# Patient Record
Sex: FEMALE | Race: BLACK OR AFRICAN AMERICAN | URBAN - METROPOLITAN AREA
[De-identification: names, ages, dates, MRNs, and addresses within clinical notes are randomized per-mention and may not be internally consistent; named-entity substitution may affect disease eponyms.]

---

## 2023-06-26 SDOH — ECONOMIC STABILITY: FOOD INSECURITY: WITHIN THE PAST 12 MONTHS, THE FOOD YOU BOUGHT JUST DIDN'T LAST AND YOU DIDN'T HAVE MONEY TO GET MORE.: NEVER TRUE

## 2023-06-26 SDOH — ECONOMIC STABILITY: TRANSPORTATION INSECURITY
IN THE PAST 12 MONTHS, HAS LACK OF TRANSPORTATION KEPT YOU FROM MEETINGS, WORK, OR FROM GETTING THINGS NEEDED FOR DAILY LIVING?: NO

## 2023-06-26 SDOH — ECONOMIC STABILITY: INCOME INSECURITY: HOW HARD IS IT FOR YOU TO PAY FOR THE VERY BASICS LIKE FOOD, HOUSING, MEDICAL CARE, AND HEATING?: SOMEWHAT HARD

## 2023-06-26 SDOH — ECONOMIC STABILITY: HOUSING INSECURITY
IN THE LAST 12 MONTHS, WAS THERE A TIME WHEN YOU DID NOT HAVE A STEADY PLACE TO SLEEP OR SLEPT IN A SHELTER (INCLUDING NOW)?: NO

## 2023-06-26 SDOH — ECONOMIC STABILITY: FOOD INSECURITY: WITHIN THE PAST 12 MONTHS, YOU WORRIED THAT YOUR FOOD WOULD RUN OUT BEFORE YOU GOT MONEY TO BUY MORE.: SOMETIMES TRUE

## 2023-06-29 ENCOUNTER — OFFICE VISIT (OUTPATIENT)
Dept: OBGYN CLINIC | Age: 28
End: 2023-06-29
Payer: COMMERCIAL

## 2023-06-29 VITALS
HEIGHT: 71 IN | WEIGHT: 208 LBS | BODY MASS INDEX: 29.12 KG/M2 | SYSTOLIC BLOOD PRESSURE: 114 MMHG | DIASTOLIC BLOOD PRESSURE: 66 MMHG

## 2023-06-29 DIAGNOSIS — Z12.4 PAP SMEAR FOR CERVICAL CANCER SCREENING: ICD-10-CM

## 2023-06-29 DIAGNOSIS — Z11.3 SCREEN FOR STD (SEXUALLY TRANSMITTED DISEASE): ICD-10-CM

## 2023-06-29 DIAGNOSIS — N89.8 VAGINAL DISCHARGE: ICD-10-CM

## 2023-06-29 DIAGNOSIS — Z12.4 PAP SMEAR FOR CERVICAL CANCER SCREENING: Primary | ICD-10-CM

## 2023-06-29 DIAGNOSIS — Z01.419 WOMEN'S ANNUAL ROUTINE GYNECOLOGICAL EXAMINATION: ICD-10-CM

## 2023-06-29 LAB
HBV SURFACE AG SER QL: NONREACTIVE
HCV AB SER QL: NONREACTIVE
HIV 1+2 AB+HIV1 P24 AG SERPL QL IA: NONREACTIVE
HIV 1/2 RESULT COMMENT: NORMAL

## 2023-06-29 PROCEDURE — 99385 PREV VISIT NEW AGE 18-39: CPT | Performed by: NURSE PRACTITIONER

## 2023-06-29 RX ORDER — DUPILUMAB 300 MG/2ML
INJECTION, SOLUTION SUBCUTANEOUS
COMMUNITY
Start: 2023-06-19

## 2023-06-29 RX ORDER — VALACYCLOVIR HYDROCHLORIDE 500 MG/1
500 TABLET, FILM COATED ORAL 2 TIMES DAILY
Qty: 6 TABLET | Refills: 6 | Status: SHIPPED | OUTPATIENT
Start: 2023-06-29 | End: 2023-07-02

## 2023-06-29 RX ORDER — VALACYCLOVIR HYDROCHLORIDE 500 MG/1
500 TABLET, FILM COATED ORAL PRN
COMMUNITY

## 2023-06-29 ASSESSMENT — PATIENT HEALTH QUESTIONNAIRE - PHQ9
SUM OF ALL RESPONSES TO PHQ QUESTIONS 1-9: 1
SUM OF ALL RESPONSES TO PHQ QUESTIONS 1-9: 1
SUM OF ALL RESPONSES TO PHQ9 QUESTIONS 1 & 2: 1
1. LITTLE INTEREST OR PLEASURE IN DOING THINGS: 1
2. FEELING DOWN, DEPRESSED OR HOPELESS: 0
SUM OF ALL RESPONSES TO PHQ QUESTIONS 1-9: 1
SUM OF ALL RESPONSES TO PHQ QUESTIONS 1-9: 1

## 2023-06-30 LAB — RPR SER QL: NONREACTIVE

## 2023-07-01 LAB
A VAGINAE DNA VAG QL NAA+PROBE: ABNORMAL SCORE
BVAB2 DNA VAG QL NAA+PROBE: ABNORMAL SCORE
C ALBICANS DNA VAG QL NAA+PROBE: NEGATIVE
C GLABRATA DNA VAG QL NAA+PROBE: NEGATIVE
C TRACH RRNA SPEC QL NAA+PROBE: NEGATIVE
MEGA1 DNA VAG QL NAA+PROBE: ABNORMAL SCORE
N GONORRHOEA RRNA SPEC QL NAA+PROBE: NEGATIVE
SPECIMEN SOURCE: ABNORMAL
T VAGINALIS RRNA SPEC QL NAA+PROBE: NEGATIVE

## 2023-07-02 ENCOUNTER — TELEPHONE (OUTPATIENT)
Dept: OBGYN CLINIC | Age: 28
End: 2023-07-02

## 2023-07-02 DIAGNOSIS — N76.0 BACTERIAL VAGINOSIS: Primary | ICD-10-CM

## 2023-07-02 DIAGNOSIS — A60.00 GENITAL HERPES SIMPLEX, UNSPECIFIED SITE: ICD-10-CM

## 2023-07-02 DIAGNOSIS — B96.89 BACTERIAL VAGINOSIS: Primary | ICD-10-CM

## 2023-07-03 RX ORDER — METRONIDAZOLE 500 MG/1
500 TABLET ORAL 2 TIMES DAILY
Qty: 14 TABLET | Refills: 0 | Status: SHIPPED | OUTPATIENT
Start: 2023-07-03 | End: 2023-07-10

## 2023-07-03 RX ORDER — VALACYCLOVIR HYDROCHLORIDE 500 MG/1
TABLET, FILM COATED ORAL
Qty: 30 TABLET | Refills: 11 | Status: SHIPPED | OUTPATIENT
Start: 2023-07-03

## 2023-07-06 LAB
CYTOLOGIST CVX/VAG CYTO: NORMAL
CYTOLOGY CVX/VAG DOC THIN PREP: NORMAL
HPV REFLEX: NORMAL
Lab: NORMAL
PATH REPORT.FINAL DX SPEC: NORMAL
STAT OF ADQ CVX/VAG CYTO-IMP: NORMAL

## 2023-07-29 PROBLEM — Z01.419 WOMEN'S ANNUAL ROUTINE GYNECOLOGICAL EXAMINATION: Status: RESOLVED | Noted: 2023-06-29 | Resolved: 2023-07-29

## 2023-11-02 ENCOUNTER — OFFICE VISIT (OUTPATIENT)
Dept: OBGYN CLINIC | Age: 28
End: 2023-11-02

## 2023-11-02 VITALS
SYSTOLIC BLOOD PRESSURE: 110 MMHG | DIASTOLIC BLOOD PRESSURE: 60 MMHG | BODY MASS INDEX: 30.1 KG/M2 | WEIGHT: 215 LBS | HEIGHT: 71 IN

## 2023-11-02 DIAGNOSIS — N89.8 VAGINAL DISCHARGE: ICD-10-CM

## 2023-11-02 DIAGNOSIS — N92.6 IRREGULAR MENSES: Primary | ICD-10-CM

## 2023-11-02 DIAGNOSIS — N92.6 IRREGULAR MENSES: ICD-10-CM

## 2023-11-02 LAB
ALBUMIN SERPL-MCNC: 3.4 G/DL (ref 3.5–5)
ALBUMIN/GLOB SERPL: 0.8 (ref 0.4–1.6)
ALP SERPL-CCNC: 63 U/L (ref 50–136)
ALT SERPL-CCNC: 26 U/L (ref 12–65)
ANION GAP SERPL CALC-SCNC: 3 MMOL/L (ref 2–11)
AST SERPL-CCNC: 21 U/L (ref 15–37)
BILIRUB SERPL-MCNC: 0.2 MG/DL (ref 0.2–1.1)
BILIRUBIN, URINE, POC: NEGATIVE
BLOOD URINE, POC: NORMAL
BUN SERPL-MCNC: 7 MG/DL (ref 6–23)
CALCIUM SERPL-MCNC: 9.1 MG/DL (ref 8.3–10.4)
CHLORIDE SERPL-SCNC: 109 MMOL/L (ref 101–110)
CO2 SERPL-SCNC: 28 MMOL/L (ref 21–32)
CREAT SERPL-MCNC: 1.1 MG/DL (ref 0.6–1)
ERYTHROCYTE [DISTWIDTH] IN BLOOD BY AUTOMATED COUNT: 17.5 % (ref 11.9–14.6)
GLOBULIN SER CALC-MCNC: 4.4 G/DL (ref 2.8–4.5)
GLUCOSE SERPL-MCNC: 128 MG/DL (ref 65–100)
GLUCOSE URINE, POC: NEGATIVE
HCG, PREGNANCY, URINE, POC: NEGATIVE
HCT VFR BLD AUTO: 37.5 % (ref 35.8–46.3)
HGB BLD-MCNC: 11.3 G/DL (ref 11.7–15.4)
KETONES, URINE, POC: NEGATIVE
LEUKOCYTE ESTERASE, URINE, POC: NEGATIVE
MCH RBC QN AUTO: 26.5 PG (ref 26.1–32.9)
MCHC RBC AUTO-ENTMCNC: 30.1 G/DL (ref 31.4–35)
MCV RBC AUTO: 87.8 FL (ref 82–102)
NITRITE, URINE, POC: NEGATIVE
NRBC # BLD: 0 K/UL (ref 0–0.2)
PH, URINE, POC: 5.5 (ref 4.6–8)
PLATELET # BLD AUTO: 493 K/UL (ref 150–450)
PMV BLD AUTO: 10.5 FL (ref 9.4–12.3)
POTASSIUM SERPL-SCNC: 4.5 MMOL/L (ref 3.5–5.1)
PROT SERPL-MCNC: 7.8 G/DL (ref 6.3–8.2)
PROTEIN,URINE, POC: NEGATIVE
RBC # BLD AUTO: 4.27 M/UL (ref 4.05–5.2)
SODIUM SERPL-SCNC: 140 MMOL/L (ref 133–143)
SPECIFIC GRAVITY, URINE, POC: 1.03 (ref 1–1.03)
TSH W FREE THYROID IF ABNORMAL: 1.08 UIU/ML (ref 0.36–3.74)
URINALYSIS CLARITY, POC: CLEAR
URINALYSIS COLOR, POC: YELLOW
UROBILINOGEN, POC: NORMAL
VALID INTERNAL CONTROL, POC: YES
WBC # BLD AUTO: 7.9 K/UL (ref 4.3–11.1)

## 2023-11-02 NOTE — PROGRESS NOTES
Patient comes in today for vaginal discharge. Patient states she had BV which she took the medication but it did not work. Patient states the discharge is constant. Patient states the irregular bleeding started on 10/23/2023. Patient states she passed large blood clots and was having heavy bleed after that. Patient states she did not have a period since mid August.     LAST PAP:  06/29/2023, Negative     LAST MAMMO:  Never    LMP:  Patient's last menstrual period was 10/23/2023 (approximate).     BIRTH CONTROL:  none    TOBACCO USE:  No    FAMILY HISTORY OF:   Breast Cancer:  No   Ovarian Cancer:  No   Uterine Cancer:  No   Colon Cancer:  No    Vitals:    11/02/23 1443   BP: 110/60   Site: Left Upper Arm   Position: Sitting   Weight: 97.5 kg (215 lb)   Height: 1.803 m (5' 11\")        Carolina Worthy MA  11/02/23  2:50 PM
Irregular menses - Primary     UPT neg, UA unremarkable  nuswab collected  Tsh, cbc, cmp today  RTO for pelvic US         Relevant Orders    AMB POC URINE PREGNANCY TEST, VISUAL COLOR COMPARISON (Completed)    AMB POC URINALYSIS DIP STICK AUTO W/ MICRO (Completed)    CBC    TSH with Reflex    Comprehensive Metabolic Panel       Orders Placed This Encounter   Procedures    NuSwab VG Plus+Mycoplasmas,LEDY    CBC    TSH with Reflex    Comprehensive Metabolic Panel    AMB POC URINE PREGNANCY TEST, VISUAL COLOR COMPARISON    AMB POC URINALYSIS DIP STICK AUTO W/ MICRO       Outpatient Encounter Medications as of 11/2/2023   Medication Sig Dispense Refill    valACYclovir (VALTREX) 500 MG tablet Take 1 tablet by mouth daily for maintenance. 30 tablet 11    DUPIXENT 300 MG/2ML SOSY injection        No facility-administered encounter medications on file as of 11/2/2023.                ARLENE Thompson - CNP 11/02/23 3:20 PM

## 2023-11-03 DIAGNOSIS — D64.9 LOW HEMOGLOBIN: Primary | ICD-10-CM

## 2023-11-03 RX ORDER — FERROUS SULFATE 325(65) MG
325 TABLET ORAL DAILY
Qty: 30 TABLET | Refills: 6 | Status: SHIPPED | OUTPATIENT
Start: 2023-11-03

## 2023-11-03 RX ORDER — DOCUSATE SODIUM 100 MG/1
100 CAPSULE, LIQUID FILLED ORAL 2 TIMES DAILY PRN
Qty: 60 CAPSULE | Refills: 6 | Status: SHIPPED | OUTPATIENT
Start: 2023-11-03

## 2023-11-03 NOTE — TELEPHONE ENCOUNTER
Thyroid normal  Glucose slightly elevated and Hgb is low      She needs to start taking   FeSO4 325mg PO once daily Disp: 30 RF:6  Colace 100mg PO BID Disp: 60 RF: 6 prn for constipation    Also encourage foods that are high in iron. Also increase fluids and foods high in fiber to prevent constipation. I would like to recheck cbc, cmp fasting. We can move next visit to a morning appt or she can come at another time to just do fasting labs in 4 weeks.

## 2023-11-03 NOTE — TELEPHONE ENCOUNTER
Called pt, message below reviewed with pt, pt voiced understanding and next visit moved to earlier time so pt can be fasting.      Rx pend

## 2023-11-05 LAB
A VAGINAE DNA VAG QL NAA+PROBE: ABNORMAL SCORE
BVAB2 DNA VAG QL NAA+PROBE: ABNORMAL SCORE
C ALBICANS DNA VAG QL NAA+PROBE: NEGATIVE
C GLABRATA DNA VAG QL NAA+PROBE: NEGATIVE
C TRACH RRNA SPEC QL NAA+PROBE: NEGATIVE
M GENITALIUM DNA SPEC QL NAA+PROBE: NEGATIVE
M HOMINIS DNA SPEC QL NAA+PROBE: POSITIVE
MEGA1 DNA VAG QL NAA+PROBE: ABNORMAL SCORE
N GONORRHOEA RRNA SPEC QL NAA+PROBE: POSITIVE
SPECIMEN SOURCE: ABNORMAL
T VAGINALIS RRNA SPEC QL NAA+PROBE: NEGATIVE
UREAPLASMA DNA SPEC QL NAA+PROBE: POSITIVE

## 2023-11-06 ENCOUNTER — TELEPHONE (OUTPATIENT)
Dept: OBGYN CLINIC | Age: 28
End: 2023-11-06

## 2023-11-06 PROBLEM — A49.3 MYCOPLASMA INFECTION: Status: ACTIVE | Noted: 2023-11-06

## 2023-11-06 PROBLEM — A54.9 GONORRHEA: Status: ACTIVE | Noted: 2023-11-06

## 2023-11-06 NOTE — TELEPHONE ENCOUNTER
----- Message from Jovany Toribio MD sent at 11/6/2023  9:24 AM EST -----  Please notify patient of positive Gonorrhea and Mycoplasma/Ureaplasma test and need for treatment with rochephin 500 mg IM x 1 - she will need to come in for that. She will also need Rx for Z-pack. This can be sent to her pharmacy of choice. She needs to understand that this is an STD and her partner needs treatment also.

## 2023-11-07 ENCOUNTER — NURSE ONLY (OUTPATIENT)
Dept: OBGYN CLINIC | Age: 28
End: 2023-11-07

## 2023-11-07 ENCOUNTER — TELEPHONE (OUTPATIENT)
Dept: OBGYN CLINIC | Age: 28
End: 2023-11-07

## 2023-11-07 DIAGNOSIS — Z20.2 STD EXPOSURE: ICD-10-CM

## 2023-11-07 DIAGNOSIS — Z22.39 CARRIER OF UREAPLASMA UREALYTICUM: Primary | ICD-10-CM

## 2023-11-07 DIAGNOSIS — Z20.2 STD EXPOSURE: Primary | ICD-10-CM

## 2023-11-07 RX ORDER — CEFTRIAXONE 500 MG/1
500 INJECTION, POWDER, FOR SOLUTION INTRAMUSCULAR; INTRAVENOUS ONCE
Status: SHIPPED | OUTPATIENT
Start: 2023-11-07

## 2023-11-07 RX ORDER — AZITHROMYCIN 250 MG/1
TABLET, FILM COATED ORAL
Qty: 6 TABLET | Refills: 0 | Status: SHIPPED | OUTPATIENT
Start: 2023-11-07

## 2023-11-07 NOTE — TELEPHONE ENCOUNTER
Called patient, reviewed results. Scheduled 1:15pm injection today 11/7/23.   3 mo CORY scheduled. Z-pack sent into patients preferred pharmacy. Reviewed with patient that she needs to update her partner for testing and treatment as well as refrain from sexual activity for at least 10-14 days and to utilize safe sex practices to prevent re-infection. Patient asked about getting STD blood work while she was in the office today-stated that provider review is needed before. Patient verbalized understanding.

## 2023-11-07 NOTE — PROGRESS NOTES
Patient here for rocephin injection. [address]: [id number]  LOT: [id number]  EXP: 05/2025  Location: [facility]    Patient tolerated well. Patient went next door for STD lab work after lab slip was given.

## 2023-11-07 NOTE — TELEPHONE ENCOUNTER
Called patient, number no longer in service, sent [employer] message back to patient asking for updated phone number.

## 2023-11-07 NOTE — TELEPHONE ENCOUNTER
----- Message from Shana Trinidad MD sent at 11/6/2023  9:24 AM EST -----  Please notify patient of positive Gonorrhea and Mycoplasma/Ureaplasma test and need for treatment with rochephin 500 mg IM x 1 - she will need to come in for that. She will also need Rx for Z-pack. This can be sent to her pharmacy of choice. She needs to understand that this is an STD and her partner needs treatment also.

## 2023-11-08 LAB — RPR SER QL: NONREACTIVE

## 2023-11-30 ENCOUNTER — OFFICE VISIT (OUTPATIENT)
Dept: OBGYN CLINIC | Age: 28
End: 2023-11-30
Payer: COMMERCIAL

## 2023-11-30 VITALS
DIASTOLIC BLOOD PRESSURE: 70 MMHG | WEIGHT: 214 LBS | SYSTOLIC BLOOD PRESSURE: 112 MMHG | HEIGHT: 71 IN | BODY MASS INDEX: 29.96 KG/M2

## 2023-11-30 DIAGNOSIS — N92.6 IRREGULAR MENSES: Primary | ICD-10-CM

## 2023-11-30 DIAGNOSIS — A54.9 GONORRHEA: ICD-10-CM

## 2023-11-30 DIAGNOSIS — N89.8 VAGINAL DISCHARGE: ICD-10-CM

## 2023-11-30 DIAGNOSIS — N92.6 IRREGULAR MENSES: ICD-10-CM

## 2023-11-30 LAB
ALBUMIN SERPL-MCNC: 3.6 G/DL (ref 3.5–5)
ALBUMIN/GLOB SERPL: 0.9 (ref 0.4–1.6)
ALP SERPL-CCNC: 53 U/L (ref 50–136)
ALT SERPL-CCNC: 19 U/L (ref 12–65)
ANION GAP SERPL CALC-SCNC: 4 MMOL/L (ref 2–11)
AST SERPL-CCNC: 14 U/L (ref 15–37)
BILIRUB SERPL-MCNC: 0.5 MG/DL (ref 0.2–1.1)
BUN SERPL-MCNC: 7 MG/DL (ref 6–23)
CALCIUM SERPL-MCNC: 9.4 MG/DL (ref 8.3–10.4)
CHLORIDE SERPL-SCNC: 109 MMOL/L (ref 101–110)
CO2 SERPL-SCNC: 26 MMOL/L (ref 21–32)
CREAT SERPL-MCNC: 0.9 MG/DL (ref 0.6–1)
ERYTHROCYTE [DISTWIDTH] IN BLOOD BY AUTOMATED COUNT: 18.6 % (ref 11.9–14.6)
GLOBULIN SER CALC-MCNC: 3.9 G/DL (ref 2.8–4.5)
GLUCOSE SERPL-MCNC: 82 MG/DL (ref 65–100)
HCT VFR BLD AUTO: 37.8 % (ref 35.8–46.3)
HGB BLD-MCNC: 11.3 G/DL (ref 11.7–15.4)
MCH RBC QN AUTO: 27.9 PG (ref 26.1–32.9)
MCHC RBC AUTO-ENTMCNC: 29.9 G/DL (ref 31.4–35)
MCV RBC AUTO: 93.3 FL (ref 82–102)
NRBC # BLD: 0 K/UL (ref 0–0.2)
PLATELET # BLD AUTO: 315 K/UL (ref 150–450)
PMV BLD AUTO: 11.2 FL (ref 9.4–12.3)
POTASSIUM SERPL-SCNC: 4.9 MMOL/L (ref 3.5–5.1)
PROT SERPL-MCNC: 7.5 G/DL (ref 6.3–8.2)
RBC # BLD AUTO: 4.05 M/UL (ref 4.05–5.2)
SODIUM SERPL-SCNC: 139 MMOL/L (ref 133–143)
WBC # BLD AUTO: 6.3 K/UL (ref 4.3–11.1)

## 2023-11-30 PROCEDURE — 76830 TRANSVAGINAL US NON-OB: CPT | Performed by: NURSE PRACTITIONER

## 2023-11-30 PROCEDURE — 99213 OFFICE O/P EST LOW 20 MIN: CPT | Performed by: NURSE PRACTITIONER

## 2023-11-30 NOTE — ASSESSMENT & PLAN NOTE
Pt treated for BV, ureaplasma, and gonorrhea 11/7/2023  CORY today  Repeat cbc, cmp today  US today uterus 10 cm, ES 12 mm, ovaries nl, no FF noted    Pt reports recurrent BV, will await nuswab results.  If again + BV, plan metrogel or boric acid twice weekly

## 2023-11-30 NOTE — PROGRESS NOTES
Pt comes in today for US and follow up visit. Pt states she feels like she has consistent BV. Took medication but still having symptoms. LAST PAP:  06/29/2023 negative      LAST MAMMO:  never     LMP:  Patient's last menstrual period was 11/22/2023 (exact date).     BIRTH CONTROL:  none    TOBACCO USE:  No    FAMILY HISTORY OF:   Breast Cancer:  No   Ovarian Cancer:  No   Uterine Cancer:  No   Colon Cancer:  No    Vitals:    11/30/23 1039   BP: 112/70   Site: Left Upper Arm   Position: Sitting   Weight: 97.1 kg (214 lb)   Height: 1.803 m (5' 11\")        [facility]  11/30/23  10:47 AM
Constitutional:  well-developed, well-nourished, and in no distress. Mental: Alert and awake. Oriented to person/place/time. Able to follow commands    Eyes: EOM nl, Sclera nl, Ocular Discharge not visualized    HENT: Normocephalic and atraumatic. Mouth/Throat: Mucous membranes are moist    External Ears: nl    Neck: Normal range of motion. No masses visualized       Pulmonary: Effort normal. No visualized signs of difficulty breathing or respiratory distress    Pelvic Exam:       External: normal female genitalia without lesions or masses       Vagina: normal without lesions or masses, small amt of white discharge noted      Cervix: normal without lesions or masses       Musculoskeletal: Normal range of motion. Normal gait with no signs of ataxia     Neurological: No Facial Asymmetry (Cranial nerve 7 motor function) No gaze palsy    Skin: No significant exanthematous lesions or discoloration noted on facial skin      Psych: Normal affect. No hallucinations              Assessment/Plan            Patient Active Problem List    Diagnosis Date Noted    Gonorrhea 11/06/2023    Mycoplasma infection 11/06/2023    Irregular menses 11/02/2023    Vaginal discharge 06/29/2023     Assessment & Plan Note:     Pt treated for BV, ureaplasma, and gonorrhea 11/7/2023  CORY today  Repeat cbc, cmp today  US today uterus 10 cm, ES 12 mm, ovaries nl, no FF noted    Pt reports recurrent BV, will await nuswab results. If again + BV, plan metrogel or boric acid twice weekly          Problem List Items Addressed This Visit          Other    Vaginal discharge     Pt treated for BV, ureaplasma, and gonorrhea 11/7/2023  CORY today  Repeat cbc, cmp today  US today uterus 10 cm, ES 12 mm, ovaries nl, no FF noted    Pt reports recurrent BV, will await nuswab results.  If again + BV, plan metrogel or boric acid twice weekly          Irregular menses - Primary    Relevant Orders    AMB POC US, TRANSVAGINAL (Completed)    Comprehensive

## 2023-12-04 DIAGNOSIS — A49.3 MYCOPLASMA INFECTION: Primary | ICD-10-CM

## 2023-12-04 DIAGNOSIS — Z22.39 CARRIER OF UREAPLASMA UREALYTICUM: ICD-10-CM

## 2023-12-04 DIAGNOSIS — Z79.2 PROPHYLACTIC ANTIBIOTIC: ICD-10-CM

## 2023-12-04 DIAGNOSIS — B96.89 BACTERIAL VAGINOSIS: ICD-10-CM

## 2023-12-04 DIAGNOSIS — N76.0 BACTERIAL VAGINOSIS: ICD-10-CM

## 2023-12-04 DIAGNOSIS — B37.9 YEAST INFECTION: ICD-10-CM

## 2023-12-04 LAB
A VAGINAE DNA VAG QL NAA+PROBE: ABNORMAL SCORE
BVAB2 DNA VAG QL NAA+PROBE: ABNORMAL SCORE
C ALBICANS DNA VAG QL NAA+PROBE: NEGATIVE
C GLABRATA DNA VAG QL NAA+PROBE: NEGATIVE
C TRACH RRNA SPEC QL NAA+PROBE: NEGATIVE
M GENITALIUM DNA SPEC QL NAA+PROBE: NEGATIVE
M HOMINIS DNA SPEC QL NAA+PROBE: POSITIVE
MEGA1 DNA VAG QL NAA+PROBE: ABNORMAL SCORE
N GONORRHOEA RRNA SPEC QL NAA+PROBE: NEGATIVE
SPECIMEN SOURCE: ABNORMAL
T VAGINALIS RRNA SPEC QL NAA+PROBE: NEGATIVE
UREAPLASMA DNA SPEC QL NAA+PROBE: POSITIVE

## 2023-12-04 RX ORDER — METRONIDAZOLE 7.5 MG/G
GEL VAGINAL
Qty: 70 G | Refills: 3 | Status: SHIPPED | OUTPATIENT
Start: 2023-12-04

## 2023-12-04 RX ORDER — FLUCONAZOLE 150 MG/1
TABLET ORAL
Qty: 2 TABLET | Refills: 0 | Status: SHIPPED | OUTPATIENT
Start: 2023-12-04

## 2023-12-04 RX ORDER — MOXIFLOXACIN HYDROCHLORIDE 400 MG/1
400 TABLET ORAL DAILY
Qty: 7 TABLET | Refills: 0 | Status: SHIPPED | OUTPATIENT
Start: 2023-12-04 | End: 2023-12-11

## 2023-12-04 RX ORDER — DOXYCYCLINE HYCLATE 100 MG
100 TABLET ORAL 2 TIMES DAILY
Qty: 14 TABLET | Refills: 0 | Status: SHIPPED | OUTPATIENT
Start: 2023-12-04 | End: 2023-12-11

## 2023-12-04 RX ORDER — METRONIDAZOLE 7.5 MG/G
GEL VAGINAL
Qty: 70 G | Refills: 0 | Status: SHIPPED | OUTPATIENT
Start: 2023-12-04

## 2023-12-04 NOTE — TELEPHONE ENCOUNTER
Patient tested positive for ureaplasma and mycoplasma again. Did she complete her treatment last time as prescribed. Doxycycline followed by Azithromycin. If so, she now needs       Doxycycline 100 mg PO BID Disp: 14 RF:0 followed by  Monifloxacin 400 mg once daily for 7 days        2. Patient tested positive for Bacterial Vaginosis. If having symptoms may have one of the following for treatment if not allergic    Flagyl 500mg PO BID for 7 days, #14, No Refills   OR  Metrogel apply 1 applicator nightly for 5 nights, #5, No Refills        She said she wanted to start prophylaxis for BV.  She can start Boric acid 600 mg vaginally twice a week Disp: 8 RF:11 OR  Metrogel 5 grams vaginally two times/week Disp: 70 grams RF:3        3. May have diflucan if needed to prevent yeast infection  Diflucan 150 mg PO Take 1 tab and repeat in 3 days if needed, #2, No Refills (NOT TO BE PRESCRIBED IF PREGNANT)

## 2023-12-04 NOTE — TELEPHONE ENCOUNTER
Called pt, message below reviewed with pt, pt voiced understanding and stated she did complete previous treatment for ureaplasma and MyCoplasma. Pt also stated she would like Metrogel for BV prophylaxis treatment.      RX pend

## 2024-01-24 ENCOUNTER — OFFICE VISIT (OUTPATIENT)
Dept: PRIMARY CARE CLINIC | Facility: CLINIC | Age: 29
End: 2024-01-24
Payer: COMMERCIAL

## 2024-01-24 VITALS
HEIGHT: 71 IN | OXYGEN SATURATION: 99 % | DIASTOLIC BLOOD PRESSURE: 68 MMHG | TEMPERATURE: 97.9 F | HEART RATE: 50 BPM | BODY MASS INDEX: 28.14 KG/M2 | WEIGHT: 201 LBS | SYSTOLIC BLOOD PRESSURE: 118 MMHG

## 2024-01-24 DIAGNOSIS — Z76.89 ENCOUNTER TO ESTABLISH CARE WITH NEW DOCTOR: Primary | ICD-10-CM

## 2024-01-24 DIAGNOSIS — E66.3 OVERWEIGHT (BMI 25.0-29.9): ICD-10-CM

## 2024-01-24 DIAGNOSIS — D50.9 IRON DEFICIENCY ANEMIA, UNSPECIFIED IRON DEFICIENCY ANEMIA TYPE: ICD-10-CM

## 2024-01-24 DIAGNOSIS — L30.9 SEVERE ECZEMA: ICD-10-CM

## 2024-01-24 PROBLEM — A49.3 MYCOPLASMA INFECTION: Status: ACTIVE | Noted: 2024-01-24

## 2024-01-24 PROCEDURE — 99204 OFFICE O/P NEW MOD 45 MIN: CPT | Performed by: FAMILY MEDICINE

## 2024-01-24 RX ORDER — PHENTERMINE HYDROCHLORIDE 37.5 MG/1
37.5 TABLET ORAL
Qty: 31 TABLET | Refills: 0 | Status: SHIPPED | OUTPATIENT
Start: 2024-01-24 | End: 2024-02-24

## 2024-01-24 ASSESSMENT — ENCOUNTER SYMPTOMS
COUGH: 0
NAUSEA: 0
SHORTNESS OF BREATH: 0
ABDOMINAL PAIN: 0
DIARRHEA: 0
VOMITING: 0

## 2024-01-24 ASSESSMENT — PATIENT HEALTH QUESTIONNAIRE - PHQ9
SUM OF ALL RESPONSES TO PHQ QUESTIONS 1-9: 0
1. LITTLE INTEREST OR PLEASURE IN DOING THINGS: 0
SUM OF ALL RESPONSES TO PHQ QUESTIONS 1-9: 0
SUM OF ALL RESPONSES TO PHQ9 QUESTIONS 1 & 2: 0
DEPRESSION UNABLE TO ASSESS: PT REFUSES
SUM OF ALL RESPONSES TO PHQ QUESTIONS 1-9: 0
SUM OF ALL RESPONSES TO PHQ QUESTIONS 1-9: 0
2. FEELING DOWN, DEPRESSED OR HOPELESS: 0

## 2024-01-24 NOTE — PROGRESS NOTES
breakfast) for 31 days. Max Daily Amount: 37.5 mg, Disp: 31 tablet, Rfl: 0    ferrous sulfate (IRON 325) 325 (65 Fe) MG tablet, Take 1 tablet by mouth daily, Disp: 30 tablet, Rfl: 6    valACYclovir (VALTREX) 500 MG tablet, Take 1 tablet by mouth daily for maintenance., Disp: 30 tablet, Rfl: 11    DUPIXENT 300 MG/2ML SOSY injection, Inject into the skin Every other week, Disp: , Rfl:     ALLERGIES / INTOLERANCES    No Known Allergies    REVIEW OF SYSTEMS    Review of Systems   Constitutional:  Negative for fever.   HENT:  Negative for congestion.    Respiratory:  Negative for cough and shortness of breath.    Cardiovascular:  Negative for chest pain.   Gastrointestinal:  Negative for abdominal pain, diarrhea, nausea and vomiting.   Neurological:  Positive for dizziness and light-headedness. Negative for syncope and headaches.   Psychiatric/Behavioral:  Negative for dysphoric mood.           PHYSICAL EXAMINATION    Vitals:    01/24/24 1504   BP: 118/68   Pulse: 50   Temp: 97.9 °F (36.6 °C)   SpO2: 99%       Physical Exam  Vitals and nursing note reviewed.   Constitutional:       General: She is not in acute distress.     Appearance: Normal appearance.   HENT:      Head: Normocephalic and atraumatic.      Right Ear: External ear normal.      Left Ear: External ear normal.      Nose: Nose normal.   Eyes:      Extraocular Movements: Extraocular movements intact.      Pupils: Pupils are equal, round, and reactive to light.   Cardiovascular:      Rate and Rhythm: Normal rate and regular rhythm.      Heart sounds: Normal heart sounds. No murmur heard.  Pulmonary:      Effort: Pulmonary effort is normal. No respiratory distress.      Breath sounds: Normal breath sounds.   Abdominal:      General: Bowel sounds are normal.      Palpations: Abdomen is soft.      Tenderness: There is no abdominal tenderness. There is no right CVA tenderness or left CVA tenderness.   Musculoskeletal:         General: Normal range of motion.

## 2024-01-24 NOTE — ASSESSMENT & PLAN NOTE
Patient wants to work on losing weight, states that she has a good exercise routine and notes that she is working on diet.  Unable to lose weight around her abdomen.  BMI 28 - overweight.  Plan to start on 3 months of Phentermine, discussed side effects of the medicine.  Discussed working on healthy diet and exercise routine.  Will recheck in 4 weeks.

## 2024-01-24 NOTE — ASSESSMENT & PLAN NOTE
Last hemoglobin 11.3 with microcytosis, doing well on iron supplement.  Plans to recheck when she follows up with Ob/Gyn next month.

## 2024-02-05 ENCOUNTER — OFFICE VISIT (OUTPATIENT)
Dept: OBGYN CLINIC | Age: 29
End: 2024-02-05
Payer: COMMERCIAL

## 2024-02-05 VITALS
DIASTOLIC BLOOD PRESSURE: 68 MMHG | HEIGHT: 71 IN | BODY MASS INDEX: 27.86 KG/M2 | WEIGHT: 199 LBS | SYSTOLIC BLOOD PRESSURE: 114 MMHG

## 2024-02-05 DIAGNOSIS — N89.8 VAGINAL DISCHARGE: Primary | ICD-10-CM

## 2024-02-05 DIAGNOSIS — N89.8 VAGINAL ODOR: ICD-10-CM

## 2024-02-05 DIAGNOSIS — A54.9 GONORRHEA: ICD-10-CM

## 2024-02-05 PROCEDURE — 99213 OFFICE O/P EST LOW 20 MIN: CPT | Performed by: NURSE PRACTITIONER

## 2024-02-05 RX ORDER — METRONIDAZOLE 7.5 MG/G
GEL VAGINAL
Qty: 70 G | Refills: 3 | Status: SHIPPED | OUTPATIENT
Start: 2024-02-05

## 2024-02-05 NOTE — PROGRESS NOTES
Chaperone for Intimate Exam     Chaperone was offer accepted as part of the rooming process    Chaperone: Norma Christian

## 2024-02-05 NOTE — ASSESSMENT & PLAN NOTE
CORY today for BV, mycoplasma, and BV  Pt reports continued vaginal discharge/odor  She did not start twice week metrogel for BV prevention, new rx sent and administration instruction reviewed  Will call pt with results

## 2024-02-05 NOTE — PROGRESS NOTES
Patient here for 3 mo CORY of gonorrhea from 11/2/2023.   Patient was given rocephin on 11/7/2023.   Patient still complains of discharge and odor today even after finishing doxy and z-pack.     LAST PAP:  6/29/2023, neg.,     LAST MAMMO:  never     LMP:  Patient's last menstrual period was 01/15/2024 (exact date).    BIRTH CONTROL:  none    TOBACCO USE:  No    FAMILY HISTORY OF:   Breast Cancer:  No   Ovarian Cancer:  No   Uterine Cancer:  No   Colon Cancer:  No    Vitals:    02/05/24 1529   BP: 114/68   Site: Left Upper Arm   Position: Sitting   Weight: 90.3 kg (199 lb)   Height: 1.803 m (5' 11\")        ALICIA ELISE RN  02/05/24  3:40 PM

## 2024-02-05 NOTE — PROGRESS NOTES
Luz Gonzales is a 28 y.o.  who is here today for CORY. Pt treated for BV, ureaplasma, and gonorrhea 2023. Pt has not been sexually active since treatment. Was treated again for BV on 2023. Pt was supposed to start metrogel twice weekly but states she has not been using consistently, thought she was just supposed to take with symptoms. Today having vaginal discharge and odor    Patient's last menstrual period was 01/15/2024 (exact date).        Past Medical History:   Diagnosis Date    Cold sore     Eczema     Genital herpes     Postpartum depression 2018       Past Surgical History:   Procedure Laterality Date    BREAST REDUCTION SURGERY  2019     SECTION         Family History   Problem Relation Age of Onset    Diabetes Father     Breast Cancer Neg Hx     Colon Cancer Neg Hx     Ovarian Cancer Neg Hx     Uterine Cancer Neg Hx        Social History     Socioeconomic History    Marital status: Single     Spouse name: Not on file    Number of children: Not on file    Years of education: Not on file    Highest education level: Not on file   Occupational History    Not on file   Tobacco Use    Smoking status: Never    Smokeless tobacco: Never    Tobacco comments:     Smokes hookah occ.    Substance and Sexual Activity    Alcohol use: Yes     Alcohol/week: 1.0 standard drink of alcohol     Types: 1 Glasses of wine per week     Comment: occ.    Drug use: Never    Sexual activity: Not Currently     Partners: Male     Birth control/protection: None   Other Topics Concern    Not on file   Social History Narrative    Patient states feeling safe at home, denies abuse.      Social Determinants of Health     Financial Resource Strain: Not on file   Food Insecurity: Not on file (2023)   Transportation Needs: Not on file   Physical Activity: Not on file   Stress: Not on file   Social Connections: Not on file   Intimate Partner Violence: Not on file   Housing Stability: Not on file

## 2024-02-05 NOTE — PROGRESS NOTES
I have reviewed the patient's visit today including history, exam and assessment by TOMA Bacon.  I agree with treatment/plan as above.    Nicolas Chapin MD  4:24 PM  02/05/24

## 2024-02-08 LAB
A VAGINAE DNA VAG QL NAA+PROBE: ABNORMAL SCORE
BVAB2 DNA VAG QL NAA+PROBE: ABNORMAL SCORE
C ALBICANS DNA VAG QL NAA+PROBE: NEGATIVE
C GLABRATA DNA VAG QL NAA+PROBE: NEGATIVE
C TRACH RRNA SPEC QL NAA+PROBE: NEGATIVE
M GENITALIUM DNA SPEC QL NAA+PROBE: NEGATIVE
M HOMINIS DNA SPEC QL NAA+PROBE: NEGATIVE
MEGA1 DNA VAG QL NAA+PROBE: ABNORMAL SCORE
N GONORRHOEA RRNA SPEC QL NAA+PROBE: NEGATIVE
SPECIMEN SOURCE: ABNORMAL
T VAGINALIS RRNA SPEC QL NAA+PROBE: NEGATIVE
UREAPLASMA DNA SPEC QL NAA+PROBE: NEGATIVE

## 2024-02-08 RX ORDER — METRONIDAZOLE 500 MG/1
500 TABLET ORAL 2 TIMES DAILY
Qty: 14 TABLET | Refills: 0 | Status: SHIPPED | OUTPATIENT
Start: 2024-02-08 | End: 2024-02-15

## 2024-02-08 NOTE — TELEPHONE ENCOUNTER
Called pt, message below reviewed with pt, pt voiced understanding and wanted the Flagyl.    Rx pend

## 2024-02-08 NOTE — TELEPHONE ENCOUNTER
Patient tested positive for Bacterial Vaginosis. If having symptoms may have one of the following for treatment if not allergic    Flagyl 500mg PO BID for 7 days, #14, No Refills   OR    Metrogel apply 1 applicator nightly for 5 nights, #5, No Refills        2. Once she completes the above, she can then start the metrogel twice weekly as prescribed at last visit for prevention.

## 2024-02-26 ENCOUNTER — TELEPHONE (OUTPATIENT)
Dept: PRIMARY CARE CLINIC | Facility: CLINIC | Age: 29
End: 2024-02-26

## 2024-02-26 NOTE — TELEPHONE ENCOUNTER
Patient wants to cancel appt due to the appt being scheduled to see how the medication was going, but patient states she has not been taking the medication. She was only on it for a week.

## 2024-08-01 ENCOUNTER — OFFICE VISIT (OUTPATIENT)
Dept: OBGYN CLINIC | Age: 29
End: 2024-08-01
Payer: COMMERCIAL

## 2024-08-01 VITALS
HEIGHT: 71 IN | BODY MASS INDEX: 29.54 KG/M2 | DIASTOLIC BLOOD PRESSURE: 76 MMHG | SYSTOLIC BLOOD PRESSURE: 110 MMHG | WEIGHT: 211 LBS

## 2024-08-01 DIAGNOSIS — A60.00 GENITAL HERPES SIMPLEX, UNSPECIFIED SITE: ICD-10-CM

## 2024-08-01 DIAGNOSIS — Z01.419 WOMEN'S ANNUAL ROUTINE GYNECOLOGICAL EXAMINATION: ICD-10-CM

## 2024-08-01 DIAGNOSIS — Z11.3 SCREEN FOR STD (SEXUALLY TRANSMITTED DISEASE): ICD-10-CM

## 2024-08-01 DIAGNOSIS — N89.8 VAGINAL DISCHARGE: Primary | ICD-10-CM

## 2024-08-01 DIAGNOSIS — Z72.51 UNPROTECTED SEX: ICD-10-CM

## 2024-08-01 LAB
HBV SURFACE AG SER QL: NONREACTIVE
HCG, PREGNANCY, URINE, POC: NEGATIVE
HCV AB SER QL: NONREACTIVE
HIV 1+2 AB+HIV1 P24 AG SERPL QL IA: NONREACTIVE
HIV 1/2 RESULT COMMENT: NORMAL
T PALLIDUM AB SER QL IA: NONREACTIVE
VALID INTERNAL CONTROL, POC: YES

## 2024-08-01 PROCEDURE — 81025 URINE PREGNANCY TEST: CPT | Performed by: NURSE PRACTITIONER

## 2024-08-01 PROCEDURE — 99395 PREV VISIT EST AGE 18-39: CPT | Performed by: NURSE PRACTITIONER

## 2024-08-01 PROCEDURE — 99459 PELVIC EXAMINATION: CPT | Performed by: NURSE PRACTITIONER

## 2024-08-01 RX ORDER — VALACYCLOVIR HYDROCHLORIDE 500 MG/1
500 TABLET, FILM COATED ORAL 2 TIMES DAILY
Qty: 6 TABLET | Refills: 11 | Status: SHIPPED | OUTPATIENT
Start: 2024-08-01 | End: 2024-08-04

## 2024-08-01 SDOH — ECONOMIC STABILITY: FOOD INSECURITY: WITHIN THE PAST 12 MONTHS, THE FOOD YOU BOUGHT JUST DIDN'T LAST AND YOU DIDN'T HAVE MONEY TO GET MORE.: NEVER TRUE

## 2024-08-01 SDOH — ECONOMIC STABILITY: FOOD INSECURITY: WITHIN THE PAST 12 MONTHS, YOU WORRIED THAT YOUR FOOD WOULD RUN OUT BEFORE YOU GOT MONEY TO BUY MORE.: NEVER TRUE

## 2024-08-01 SDOH — ECONOMIC STABILITY: INCOME INSECURITY: HOW HARD IS IT FOR YOU TO PAY FOR THE VERY BASICS LIKE FOOD, HOUSING, MEDICAL CARE, AND HEATING?: NOT HARD AT ALL

## 2024-08-01 NOTE — PROGRESS NOTES
Chaperone for Intimate Exam     Chaperone was offer accepted as part of the rooming process    Chaperone: Kaia Aparicio  
Luz Gonzales is a 28 y.o.  who is here for AE        Patient's last menstrual period was 2024 (exact date).    Menses: Q month    Menstrual Complaints: none    Birth Control:condoms    Last Pap:2023, Negative     Hx abnormal pap or STD:hx HSV, GC, mycoplasma. HSV outbreaks Q 3 months    Last Mammo: never    Fam Hx of Breast, ovarian or uterine cancer:denies    Sexually Active:yes          ROS:    Breast: Denies pain, lump or nipple discharge    GYN: Denies pelvic pain,  itching, odor or dysuria. +discharge last week    Constitutional: Negative for chills and fever.     HENT: Negative for severe headaches or vision changes    Respiratory: Negative for cough and shortness of breath.      Cardiovascular: Negative for chest pain and palpitations.     Gastrointestinal: Negative for nausea and vomiting. Negative for diarrhea and constipation    Genitourinary: Negative for dysuria and hematuria.           Past Medical History:   Diagnosis Date    Anemia     Cold sore     Eczema     Genital herpes     Gonorrhea     Postpartum depression 2018       Past Surgical History:   Procedure Laterality Date    BREAST REDUCTION SURGERY  2019     SECTION         Family History   Problem Relation Age of Onset    Diabetes Father     Breast Cancer Neg Hx     Colon Cancer Neg Hx     Ovarian Cancer Neg Hx     Uterine Cancer Neg Hx        Social History     Socioeconomic History    Marital status: Single     Spouse name: Not on file    Number of children: Not on file    Years of education: Not on file    Highest education level: Not on file   Occupational History    Not on file   Tobacco Use    Smoking status: Never    Smokeless tobacco: Never    Tobacco comments:     Smokes hookah occ.    Substance and Sexual Activity    Alcohol use: Yes     Alcohol/week: 1.0 standard drink of alcohol     Types: 1 Glasses of wine per week     Comment: occ.    Drug use: Never    Sexual activity: Yes     Partners: Male     Birth 
Patient comes in today for annual exam. Patient would full panel of blood work. Patient request refill on Metrogel.    LAST PAP:  06/29/2023, Negative    LAST MAMMO:  Never    LMP:  Patient's last menstrual period was 07/04/2024 (exact date).    BIRTH CONTROL:  none    TOBACCO USE:  No    FAMILY HISTORY OF:   Breast Cancer:  No   Ovarian Cancer:  No   Uterine Cancer:  No   Colon Cancer:  No    Vitals:    08/01/24 1034   BP: 110/76   Site: Right Upper Arm   Position: Sitting   Weight: 95.7 kg (211 lb)   Height: 1.803 m (5' 11\")        Carolina Worthy MA  08/01/24  10:41 AM  
General

## 2024-08-01 NOTE — ASSESSMENT & PLAN NOTE
Pap up to date  Std screening today, c/o vaginal discharge  Using metrogel for recurrent BV, refills still at pharmacy  Refill sent for valtrex, pt taking for episodic therapy    Mammo n/a  Birth control - condoms  Rto 1 year

## 2024-08-02 LAB
ALBUMIN SERPL-MCNC: 3.5 G/DL (ref 3.5–5)
ALBUMIN/GLOB SERPL: 0.9 (ref 1–1.9)
ALP SERPL-CCNC: 52 U/L (ref 35–104)
ALT SERPL-CCNC: 22 U/L (ref 12–65)
ANION GAP SERPL CALC-SCNC: 13 MMOL/L (ref 9–18)
AST SERPL-CCNC: 24 U/L (ref 15–37)
BILIRUB SERPL-MCNC: <0.2 MG/DL (ref 0–1.2)
BUN SERPL-MCNC: 11 MG/DL (ref 6–23)
CALCIUM SERPL-MCNC: 9.5 MG/DL (ref 8.8–10.2)
CHLORIDE SERPL-SCNC: 103 MMOL/L (ref 98–107)
CO2 SERPL-SCNC: 22 MMOL/L (ref 20–28)
CREAT SERPL-MCNC: 0.9 MG/DL (ref 0.6–1.1)
GLOBULIN SER CALC-MCNC: 4 G/DL (ref 2.3–3.5)
GLUCOSE SERPL-MCNC: 94 MG/DL (ref 70–99)
POTASSIUM SERPL-SCNC: 4.9 MMOL/L (ref 3.5–5.1)
PROT SERPL-MCNC: 7.5 G/DL (ref 6.3–8.2)
SODIUM SERPL-SCNC: 139 MMOL/L (ref 136–145)

## 2024-08-04 LAB
C TRACH RRNA SPEC QL NAA+PROBE: NEGATIVE
M GENITALIUM DNA SPEC QL NAA+PROBE: NEGATIVE
M HOMINIS DNA SPEC QL NAA+PROBE: NEGATIVE
N GONORRHOEA RRNA SPEC QL NAA+PROBE: NEGATIVE
SPECIMEN SOURCE: NORMAL
T VAGINALIS RRNA SPEC QL NAA+PROBE: NEGATIVE
UREAPLASMA DNA SPEC QL NAA+PROBE: NEGATIVE

## 2024-08-05 DIAGNOSIS — N76.0 BACTERIAL VAGINOSIS: Primary | ICD-10-CM

## 2024-08-05 DIAGNOSIS — B96.89 BACTERIAL VAGINOSIS: Primary | ICD-10-CM

## 2024-08-05 RX ORDER — METRONIDAZOLE 500 MG/1
500 TABLET ORAL 2 TIMES DAILY
Qty: 14 TABLET | Refills: 0 | Status: SHIPPED | OUTPATIENT
Start: 2024-08-05 | End: 2024-08-12

## 2024-08-05 NOTE — TELEPHONE ENCOUNTER
Called pt, message below reviewed with pt, pt voiced understanding and stated she has not been using the Metrogel twice weekly. Informed pt she should have more refills but to restart it after completing the pills. Pt voiced understanding.     Rx pend

## 2024-08-05 NOTE — TELEPHONE ENCOUNTER
Patient tested positive for Bacterial Vaginosis. If having symptoms may have one of the following for treatment if not allergic        Is she still using Metrogel twice weekly for prevention      We can try flagyl PO, metrogel, or clindamycin to treat current infection  Flagyl 500mg PO BID for 7 days, #14, No Refills   OR    Metrogel Insert 5 grams nightly for 5 nights, #5, No Refills  OR    Clindamycin 2% vagial cream 5 grams nightly for 5 nights disp: 40 grams, No refills

## 2024-08-31 PROBLEM — Z01.419 WOMEN'S ANNUAL ROUTINE GYNECOLOGICAL EXAMINATION: Status: RESOLVED | Noted: 2024-08-01 | Resolved: 2024-08-31

## 2024-09-05 ENCOUNTER — OFFICE VISIT (OUTPATIENT)
Dept: PRIMARY CARE CLINIC | Facility: CLINIC | Age: 29
End: 2024-09-05

## 2024-09-05 VITALS
WEIGHT: 217 LBS | SYSTOLIC BLOOD PRESSURE: 110 MMHG | HEIGHT: 71 IN | HEART RATE: 50 BPM | DIASTOLIC BLOOD PRESSURE: 80 MMHG | BODY MASS INDEX: 30.38 KG/M2 | TEMPERATURE: 98 F | OXYGEN SATURATION: 99 %

## 2024-09-05 DIAGNOSIS — E66.9 OBESITY (BMI 30-39.9): ICD-10-CM

## 2024-09-05 DIAGNOSIS — D50.9 IRON DEFICIENCY ANEMIA, UNSPECIFIED IRON DEFICIENCY ANEMIA TYPE: ICD-10-CM

## 2024-09-05 DIAGNOSIS — Z00.00 ENCOUNTER FOR WELL ADULT EXAM WITHOUT ABNORMAL FINDINGS: Primary | ICD-10-CM

## 2024-09-05 DIAGNOSIS — N91.1 AMENORRHEA, SECONDARY: ICD-10-CM

## 2024-09-05 LAB
HCG, PREGNANCY, URINE, POC: NEGATIVE
VALID INTERNAL CONTROL, POC: YES

## 2024-09-05 RX ORDER — PHENTERMINE HYDROCHLORIDE 15 MG/1
15 CAPSULE ORAL EVERY MORNING
Qty: 30 CAPSULE | Refills: 0 | Status: SHIPPED | OUTPATIENT
Start: 2024-09-05 | End: 2024-10-05

## 2024-09-05 ASSESSMENT — ENCOUNTER SYMPTOMS
COUGH: 0
NAUSEA: 0
ABDOMINAL PAIN: 0
SHORTNESS OF BREATH: 0
VOMITING: 0
DIARRHEA: 0

## 2024-09-05 ASSESSMENT — PATIENT HEALTH QUESTIONNAIRE - PHQ9
SUM OF ALL RESPONSES TO PHQ QUESTIONS 1-9: 0
1. LITTLE INTEREST OR PLEASURE IN DOING THINGS: NOT AT ALL
SUM OF ALL RESPONSES TO PHQ QUESTIONS 1-9: 0
2. FEELING DOWN, DEPRESSED OR HOPELESS: NOT AT ALL
SUM OF ALL RESPONSES TO PHQ QUESTIONS 1-9: 0
SUM OF ALL RESPONSES TO PHQ QUESTIONS 1-9: 0
SUM OF ALL RESPONSES TO PHQ9 QUESTIONS 1 & 2: 0

## 2024-09-05 NOTE — ASSESSMENT & PLAN NOTE
Wt Readings from Last 3 Encounters:   09/05/24 98.4 kg (217 lb)   08/01/24 95.7 kg (211 lb)   02/05/24 90.3 kg (199 lb)     Patient last seen 6 months ago, hoping to lose weight but did not tolerate the full dose of phentermine.  Plan to try the 15 mg dose, advised to take it in the morning with breakfast.  Will recheck in 1 month.

## 2024-09-05 NOTE — ASSESSMENT & PLAN NOTE
Chronic over the last 2 months, negative urine pregnancy in the office today as well as 1 month ago in the OB/GYN office.  Plan to check blood work to evaluate hormone levels.  History of irregular menses but has never skipped 2 months straight.  Will continue to follow

## 2024-09-05 NOTE — PATIENT INSTRUCTIONS
hands, brush your teeth twice a day, and wear a seat belt in the car.   Where can you learn more?  Go to https://[email or web address].net/patientEd and enter P072 to learn more about \"Well Visit, Ages 18 to 65: Care Instructions.\"  Current as of: August 6, 2023  Content Version: 14.1  © 2494-9845 [employer]wise, [employer].   Care instructions adapted under license by [email or web address]. If you have questions about a medical condition or this instruction, always ask your healthcare professional. Healthwise, [employer] disclaims any warranty or liability for your use of this information.

## 2024-09-05 NOTE — PROGRESS NOTES
Well Adult Note  Name: Luz Gonzales Today’s Date: 2024   MRN: [id number] Sex: Female   Age: 29 y.o. Ethnicity: Non- / Non    : 1995 Race: Black /       Luz Gonzales is here for a well adult exam.       Subjective   History:    30 yo female presents for annual physical exam.  Has not had a period since July, thinks it was normal.  Has not had a period since.  Went next door and she had a negative pregnancy test 1 month ago.  Still hasn't had a period.  Has had some pelvic cramping for the last month but no period.  Denies changes in her diet or exercise routine.  Denies taking any new supplements.  Is interested in weight loss.  Previously took the phentermine for 2 weeks but states that it caused her heart to race so she stopped it.  Asks if there is another option to help with weight loss.  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    Review of Systems   Constitutional:  Negative for fever.   HENT:  Negative for congestion.    Respiratory:  Negative for cough and shortness of breath.    Cardiovascular:  Negative for chest pain.   Gastrointestinal:  Negative for abdominal pain, diarrhea, nausea and vomiting.   Genitourinary:  Positive for menstrual problem. Negative for vaginal bleeding.   Psychiatric/Behavioral:  Negative for dysphoric mood.        No Known Allergies  Prior to Visit Medications    Medication Sig Taking? Authorizing Provider   phentermine 15 MG capsule Take 1 capsule by mouth every morning for 30 days. Max Daily Amount: 15 mg Yes Peace Chapin DO   metroNIDAZOLE (METROGEL) 0.75 % vaginal gel Insert 5 grams into vagina two times a week (I.e. Monday and Thursday) Yes Caitlyn Velez APRN - CNP   ferrous sulfate (IRON 325) 325 (65 Fe) MG tablet Take 1 tablet by mouth daily Yes Caitlyn Velez APRN - CNP   DUPIXENT 300 MG/2ML SOSY injection Inject into the skin Every

## 2024-10-05 PROBLEM — Z00.00 ENCOUNTER FOR WELL ADULT EXAM WITHOUT ABNORMAL FINDINGS: Status: RESOLVED | Noted: 2024-09-05 | Resolved: 2024-10-05

## 2024-10-31 ENCOUNTER — OFFICE VISIT (OUTPATIENT)
Dept: PRIMARY CARE CLINIC | Facility: CLINIC | Age: 29
End: 2024-10-31

## 2024-10-31 VITALS
HEART RATE: 72 BPM | TEMPERATURE: 98.2 F | OXYGEN SATURATION: 98 % | DIASTOLIC BLOOD PRESSURE: 82 MMHG | WEIGHT: 215 LBS | BODY MASS INDEX: 29.99 KG/M2 | SYSTOLIC BLOOD PRESSURE: 126 MMHG

## 2024-10-31 DIAGNOSIS — E66.9 OBESITY (BMI 30-39.9): Primary | ICD-10-CM

## 2024-10-31 DIAGNOSIS — D50.9 IRON DEFICIENCY ANEMIA, UNSPECIFIED IRON DEFICIENCY ANEMIA TYPE: ICD-10-CM

## 2024-10-31 DIAGNOSIS — N91.1 AMENORRHEA, SECONDARY: ICD-10-CM

## 2024-10-31 DIAGNOSIS — N64.4 BREAST PAIN: ICD-10-CM

## 2024-10-31 DIAGNOSIS — Z00.00 ENCOUNTER FOR WELL ADULT EXAM WITHOUT ABNORMAL FINDINGS: ICD-10-CM

## 2024-10-31 LAB
BASOPHILS # BLD: 0 K/UL (ref 0–0.2)
BASOPHILS NFR BLD: 1 % (ref 0–2)
DIFFERENTIAL METHOD BLD: NORMAL
EOSINOPHIL # BLD: 0.2 K/UL (ref 0–0.8)
EOSINOPHIL NFR BLD: 4 % (ref 0.5–7.8)
ERYTHROCYTE [DISTWIDTH] IN BLOOD BY AUTOMATED COUNT: 13.2 % (ref 11.9–14.6)
ESTRADIOL SERPL-MCNC: 31.5 PG/ML
FSH SERPL-ACNC: 4 MIU/ML
HCG SERPL-ACNC: <1 MIU/ML
HCT VFR BLD AUTO: 39.6 % (ref 35.8–46.3)
HGB BLD-MCNC: 12.6 G/DL (ref 11.7–15.4)
IMM GRANULOCYTES # BLD AUTO: 0 K/UL (ref 0–0.5)
IMM GRANULOCYTES NFR BLD AUTO: 0 % (ref 0–5)
LYMPHOCYTES # BLD: 2.2 K/UL (ref 0.5–4.6)
LYMPHOCYTES NFR BLD: 36 % (ref 13–44)
MCH RBC QN AUTO: 29.8 PG (ref 26.1–32.9)
MCHC RBC AUTO-ENTMCNC: 31.8 G/DL (ref 31.4–35)
MCV RBC AUTO: 93.6 FL (ref 82–102)
MONOCYTES # BLD: 0.6 K/UL (ref 0.1–1.3)
MONOCYTES NFR BLD: 10 % (ref 4–12)
NEUTS SEG # BLD: 3 K/UL (ref 1.7–8.2)
NEUTS SEG NFR BLD: 49 % (ref 43–78)
NRBC # BLD: 0 K/UL (ref 0–0.2)
PLATELET # BLD AUTO: 319 K/UL (ref 150–450)
PMV BLD AUTO: 10.2 FL (ref 9.4–12.3)
PROGEST SERPL-MCNC: 0.64 NG/ML
RBC # BLD AUTO: 4.23 M/UL (ref 4.05–5.2)
TSH, 3RD GENERATION: 2.32 UIU/ML (ref 0.27–4.2)
WBC # BLD AUTO: 6.2 K/UL (ref 4.3–11.1)

## 2024-10-31 RX ORDER — PHENTERMINE HYDROCHLORIDE 37.5 MG/1
37.5 TABLET ORAL
Qty: 30 TABLET | Refills: 0 | Status: SHIPPED | OUTPATIENT
Start: 2024-10-31 | End: 2024-11-30

## 2024-10-31 ASSESSMENT — PATIENT HEALTH QUESTIONNAIRE - PHQ9
SUM OF ALL RESPONSES TO PHQ QUESTIONS 1-9: 0
SUM OF ALL RESPONSES TO PHQ9 QUESTIONS 1 & 2: 0
SUM OF ALL RESPONSES TO PHQ QUESTIONS 1-9: 0
SUM OF ALL RESPONSES TO PHQ QUESTIONS 1-9: 0
1. LITTLE INTEREST OR PLEASURE IN DOING THINGS: NOT AT ALL
2. FEELING DOWN, DEPRESSED OR HOPELESS: NOT AT ALL
SUM OF ALL RESPONSES TO PHQ QUESTIONS 1-9: 0

## 2024-10-31 ASSESSMENT — ENCOUNTER SYMPTOMS
COUGH: 0
DIARRHEA: 0
NAUSEA: 0
ABDOMINAL PAIN: 0
VOMITING: 0
SHORTNESS OF BREATH: 0

## 2024-10-31 NOTE — PATIENT INSTRUCTIONS
IT WAS GREAT TO SEE YOU TODAY!    I WILL CONTACT YOU WITH THE RESULTS OF YOUR LABS.    PLEASE TAKE ALL MEDICATION AS DISCUSSED.    ~TAKE THE PHENTERMINE IN THE MORNING TO HELP WITH WEIGHT LOSS.    PLEASE WORK ON DIET - EAT MORE LEAN PROTEINS (CHICKEN, FISH, BEANS, TURKEY), FRUITS, VEGETABLES AND DRINK MORE WATER.  EAT LESS RED MEAT, DAIRY PRODUCTS, PROCESSED STARCHES (WHITE RICE, PASTA, WHITE BREAD, TORTILLAS, CHIPS, BAKED GOODS, CANDY), AND DRINK LESS SODA, ENERGY DRINKS, JUICE, TEA, COFFEE DRINKS AND ALCOHOL.  TRY TO EAT THREE MEALS A DAY WITH SOME SORT OF PROTEIN AND TRY TO CUT BACK ON SNACKS (UNLESS IT IS HEALTHY - VEGGIES AND HUMMUS, ONE SERVING OF UNSALTED NUTS, ONE SERVING OF FRUIT, ETC).  PAY ATTENTION TO SERVING SIZES ON THE PACKAGES SO YOU DO NOT EAT LARGER PORTIONS.    Please try to do some form of aerobic exercise at least 3-4 times per week for about 20-30 minutes at a time.  Aerobic exercise can include walking, hiking, jogging, swimming or using an elliptical machine.  You can also do light weights or consider doing free exercises on your smart TV.  [employer] has multiple free exercise videos that include yoga, kickboxing, pilates, aerobics, etc.    I HAVE ORDERED A DIAGNOSTIC MAMMOGRAM FOR YOU, THEY WILL CALL YOU TO SCHEDULE THIS.    I WILL SEE YOU AGAIN IN 4 WEEKS BUT PLEASE CALL WITH CONCERNS [phone]

## 2024-10-31 NOTE — PROGRESS NOTES
[facility]  Peace Chapin, DO  [address], [place] [address] [phone]         ASSESSMENT AND PLAN    Problem List Items Addressed This Visit          Other    Iron deficiency anemia    Obesity (BMI 30-39.9) - Primary      Chronic, lost two pounds after taking a half dose of Phentermine last month, though was inconsistent with it.  Denies side effects from the half dose and wishes to try the full dose.  One month sent to the pharmacy, will recheck in 4 weeks.         Relevant Medications    phentermine (ADIPEX-P) 37.5 MG tablet    Amenorrhea, secondary      Resolved after last visit, today having breast tenderness and has not started her period yet.  Will proceed with previously-ordered labs         Breast pain      New issue recently, bilateral soreness and previously felt a lump in right breast.  Only noted abnormality is a prominent duct on the right but both breasts are tender to palpation.  Patient admits to drinking a lot of caffeine so discussed backing off of the Cels[employer] energy drinks and will order mammogram.           Relevant Orders    MAYDA DIGITAL DIAGNOSTIC W OR WO CAD BILATERAL     Other Visit Diagnoses       Encounter for well adult exam without abnormal findings                 The diagnoses and plan were discussed with the patient, who verbalizes understanding and agrees with plan.  All questions answered.    Chief Complaint    Chief Complaint   Patient presents with    Follow-up    Breast Pain     Discomfort both   Felt a mass on right        HISTORY OF PRESENT ILLNESS    29 y.o. female presents today for follow up.  Last seen one month ago for her physical, was started on low-dose Phentermine to help with weight loss and labs were ordered for amenorrhea.  States that she was not consistent with taking the Phentermine but has not gained weight.  Notes that she had a period a couple of days after her last visit.  Did note that it

## 2024-10-31 NOTE — ASSESSMENT & PLAN NOTE
Chronic, lost two pounds after taking a half dose of Phentermine last month, though was inconsistent with it.  Denies side effects from the half dose and wishes to try the full dose.  One month sent to the pharmacy, will recheck in 4 weeks.

## 2024-10-31 NOTE — ASSESSMENT & PLAN NOTE
Resolved after last visit, today having breast tenderness and has not started her period yet.  Will proceed with previously-ordered labs

## 2024-10-31 NOTE — ASSESSMENT & PLAN NOTE
New issue recently, bilateral soreness and previously felt a lump in right breast.  Only noted abnormality is a prominent duct on the right but both breasts are tender to palpation.  Patient admits to drinking a lot of caffeine so discussed backing off of the Celsius energy drinks and will order mammogram.

## 2024-12-10 ENCOUNTER — TELEPHONE (OUTPATIENT)
Dept: PRIMARY CARE CLINIC | Facility: CLINIC | Age: 29
End: 2024-12-10

## 2024-12-10 DIAGNOSIS — E66.9 OBESITY (BMI 30-39.9): Primary | ICD-10-CM

## 2024-12-10 RX ORDER — PHENTERMINE HYDROCHLORIDE 37.5 MG/1
37.5 TABLET ORAL
Qty: 30 TABLET | Refills: 0 | Status: SHIPPED | OUTPATIENT
Start: 2024-12-10 | End: 2025-01-09

## 2025-02-07 SDOH — ECONOMIC STABILITY: FOOD INSECURITY: WITHIN THE PAST 12 MONTHS, YOU WORRIED THAT YOUR FOOD WOULD RUN OUT BEFORE YOU GOT MONEY TO BUY MORE.: SOMETIMES TRUE

## 2025-02-07 SDOH — ECONOMIC STABILITY: INCOME INSECURITY: IN THE LAST 12 MONTHS, WAS THERE A TIME WHEN YOU WERE NOT ABLE TO PAY THE MORTGAGE OR RENT ON TIME?: YES

## 2025-02-07 SDOH — ECONOMIC STABILITY: FOOD INSECURITY: WITHIN THE PAST 12 MONTHS, THE FOOD YOU BOUGHT JUST DIDN'T LAST AND YOU DIDN'T HAVE MONEY TO GET MORE.: SOMETIMES TRUE

## 2025-02-07 SDOH — ECONOMIC STABILITY: TRANSPORTATION INSECURITY
IN THE PAST 12 MONTHS, HAS THE LACK OF TRANSPORTATION KEPT YOU FROM MEDICAL APPOINTMENTS OR FROM GETTING MEDICATIONS?: NO

## 2025-02-08 ASSESSMENT — PATIENT HEALTH QUESTIONNAIRE - PHQ9
SUM OF ALL RESPONSES TO PHQ QUESTIONS 1-9: 0
SUM OF ALL RESPONSES TO PHQ QUESTIONS 1-9: 0
1. LITTLE INTEREST OR PLEASURE IN DOING THINGS: NOT AT ALL
2. FEELING DOWN, DEPRESSED OR HOPELESS: NOT AT ALL
SUM OF ALL RESPONSES TO PHQ QUESTIONS 1-9: 0
1. LITTLE INTEREST OR PLEASURE IN DOING THINGS: NOT AT ALL
2. FEELING DOWN, DEPRESSED OR HOPELESS: NOT AT ALL
SUM OF ALL RESPONSES TO PHQ9 QUESTIONS 1 & 2: 0
SUM OF ALL RESPONSES TO PHQ QUESTIONS 1-9: 0
SUM OF ALL RESPONSES TO PHQ9 QUESTIONS 1 & 2: 0

## 2025-02-10 ENCOUNTER — OFFICE VISIT (OUTPATIENT)
Dept: PRIMARY CARE CLINIC | Facility: CLINIC | Age: 30
End: 2025-02-10
Payer: COMMERCIAL

## 2025-02-10 ENCOUNTER — PATIENT MESSAGE (OUTPATIENT)
Dept: PRIMARY CARE CLINIC | Facility: CLINIC | Age: 30
End: 2025-02-10

## 2025-02-10 VITALS
DIASTOLIC BLOOD PRESSURE: 84 MMHG | SYSTOLIC BLOOD PRESSURE: 126 MMHG | OXYGEN SATURATION: 99 % | TEMPERATURE: 97.8 F | HEIGHT: 71 IN | WEIGHT: 214 LBS | HEART RATE: 55 BPM | BODY MASS INDEX: 29.96 KG/M2

## 2025-02-10 DIAGNOSIS — F90.9 ADULT ADHD (ATTENTION DEFICIT HYPERACTIVITY DISORDER): ICD-10-CM

## 2025-02-10 DIAGNOSIS — F90.9 ADULT ADHD (ATTENTION DEFICIT HYPERACTIVITY DISORDER): Primary | ICD-10-CM

## 2025-02-10 PROCEDURE — 99214 OFFICE O/P EST MOD 30 MIN: CPT | Performed by: FAMILY MEDICINE

## 2025-02-10 RX ORDER — DEXTROAMPHETAMINE SACCHARATE, AMPHETAMINE ASPARTATE MONOHYDRATE, DEXTROAMPHETAMINE SULFATE AND AMPHETAMINE SULFATE 2.5; 2.5; 2.5; 2.5 MG/1; MG/1; MG/1; MG/1
10 CAPSULE, EXTENDED RELEASE ORAL DAILY
Qty: 30 CAPSULE | Refills: 0 | Status: SHIPPED | OUTPATIENT
Start: 2025-02-10 | End: 2025-02-14 | Stop reason: SDUPTHER

## 2025-02-10 ASSESSMENT — ENCOUNTER SYMPTOMS
NAUSEA: 0
ABDOMINAL PAIN: 0
DIARRHEA: 0
COUGH: 0
VOMITING: 0
SHORTNESS OF BREATH: 0

## 2025-02-10 NOTE — ASSESSMENT & PLAN NOTE
New diagnosis based on Adult ADHD Self-Report done with patient in the office.  Plan to try Adderall XR 10 mg to see if this helps with her focus.  Recheck in 4 weeks.

## 2025-02-10 NOTE — PATIENT INSTRUCTIONS
IT WAS GREAT TO SEE YOU TODAY!    PLEASE TAKE ALL MEDICATION AS DISCUSSED.    ~TAKE THE ADDERALL XR 10 MG IN THE MORNING TO HELP WITH FOCUS.      I WILL SEE YOU AGAIN IN 4 WEEKS BUT PLEASE CALL WITH CONCERNS [phone]

## 2025-02-10 NOTE — PROGRESS NOTES
reactive to light.   Cardiovascular:      Rate and Rhythm: Normal rate and regular rhythm.      Heart sounds: Normal heart sounds. No murmur heard.  Pulmonary:      Effort: Pulmonary effort is normal. No respiratory distress.      Breath sounds: Normal breath sounds.   Abdominal:      General: Bowel sounds are normal.      Palpations: Abdomen is soft.      Tenderness: There is no abdominal tenderness. There is no right CVA tenderness or left CVA tenderness.   Musculoskeletal:         General: Normal range of motion.      Cervical back: Normal range of motion.   Skin:     General: Skin is warm.      Findings: No rash.   Neurological:      General: No focal deficit present.      Mental Status: She is alert.   Psychiatric:         Mood and Affect: Mood normal.         Behavior: Behavior normal.           Peace Chapin DO  4:37 PM  02/10/25

## 2025-02-14 RX ORDER — DEXTROAMPHETAMINE SACCHARATE, AMPHETAMINE ASPARTATE MONOHYDRATE, DEXTROAMPHETAMINE SULFATE AND AMPHETAMINE SULFATE 2.5; 2.5; 2.5; 2.5 MG/1; MG/1; MG/1; MG/1
10 CAPSULE, EXTENDED RELEASE ORAL DAILY
Qty: 30 CAPSULE | Refills: 0 | Status: SHIPPED | OUTPATIENT
Start: 2025-02-14 | End: 2025-03-16

## 2025-03-12 ENCOUNTER — PATIENT MESSAGE (OUTPATIENT)
Dept: PRIMARY CARE CLINIC | Facility: CLINIC | Age: 30
End: 2025-03-12

## 2025-03-12 DIAGNOSIS — F90.9 ADULT ADHD (ATTENTION DEFICIT HYPERACTIVITY DISORDER): ICD-10-CM

## 2025-03-13 RX ORDER — DEXTROAMPHETAMINE SACCHARATE, AMPHETAMINE ASPARTATE MONOHYDRATE, DEXTROAMPHETAMINE SULFATE AND AMPHETAMINE SULFATE 2.5; 2.5; 2.5; 2.5 MG/1; MG/1; MG/1; MG/1
10 CAPSULE, EXTENDED RELEASE ORAL DAILY
Qty: 30 CAPSULE | Refills: 0 | Status: SHIPPED | OUTPATIENT
Start: 2025-03-13 | End: 2025-04-12

## 2025-04-02 ENCOUNTER — TELEMEDICINE (OUTPATIENT)
Dept: PRIMARY CARE CLINIC | Facility: CLINIC | Age: 30
End: 2025-04-02
Payer: COMMERCIAL

## 2025-04-02 DIAGNOSIS — F90.9 ADULT ADHD (ATTENTION DEFICIT HYPERACTIVITY DISORDER): Primary | ICD-10-CM

## 2025-04-02 PROCEDURE — 99214 OFFICE O/P EST MOD 30 MIN: CPT | Performed by: FAMILY MEDICINE

## 2025-04-02 RX ORDER — DEXTROAMPHETAMINE SACCHARATE, AMPHETAMINE ASPARTATE MONOHYDRATE, DEXTROAMPHETAMINE SULFATE AND AMPHETAMINE SULFATE 5; 5; 5; 5 MG/1; MG/1; MG/1; MG/1
20 CAPSULE, EXTENDED RELEASE ORAL EVERY MORNING
Qty: 30 CAPSULE | Refills: 0 | Status: SHIPPED | OUTPATIENT
Start: 2025-05-02 | End: 2025-06-01

## 2025-04-02 RX ORDER — DEXTROAMPHETAMINE SACCHARATE, AMPHETAMINE ASPARTATE MONOHYDRATE, DEXTROAMPHETAMINE SULFATE AND AMPHETAMINE SULFATE 5; 5; 5; 5 MG/1; MG/1; MG/1; MG/1
20 CAPSULE, EXTENDED RELEASE ORAL EVERY MORNING
Qty: 30 CAPSULE | Refills: 0 | Status: SHIPPED | OUTPATIENT
Start: 2025-04-02 | End: 2025-05-02

## 2025-04-02 ASSESSMENT — ENCOUNTER SYMPTOMS
ABDOMINAL PAIN: 0
COUGH: 0
SHORTNESS OF BREATH: 0
VOMITING: 0
NAUSEA: 0
DIARRHEA: 0

## 2025-04-02 ASSESSMENT — PATIENT HEALTH QUESTIONNAIRE - PHQ9
SUM OF ALL RESPONSES TO PHQ QUESTIONS 1-9: 0
1. LITTLE INTEREST OR PLEASURE IN DOING THINGS: NOT AT ALL
SUM OF ALL RESPONSES TO PHQ QUESTIONS 1-9: 0
2. FEELING DOWN, DEPRESSED OR HOPELESS: NOT AT ALL
SUM OF ALL RESPONSES TO PHQ QUESTIONS 1-9: 0
SUM OF ALL RESPONSES TO PHQ QUESTIONS 1-9: 0

## 2025-04-02 NOTE — ASSESSMENT & PLAN NOTE
Chronic, not controlled on 10 mg of Adderall XR 10 mg.  Notes decreased appetite but no other side effects.  Will try increasing dose to 20 mg and will recheck in 2 months.  Patient to let me know if she has any bad side effects.

## 2025-04-02 NOTE — PATIENT INSTRUCTIONS
IT WAS GREAT TO SEE YOU TODAY!    I WILL CONTACT YOU WITH THE RESULTS OF YOUR LABS.    PLEASE TAKE ALL MEDICATION AS DISCUSSED.    I WILL SEE YOU AGAIN IN 2 MONTHS BUT PLEASE CALL WITH CONCERNS [phone]

## 2025-04-02 NOTE — PROGRESS NOTES
[facility]  Peace Annabelle Chapin, DO  [address], [address]  [place] [id number] [phone]        Luz Gonzales is a 29 y.o. female who was seen by synchronous (real-time) audio-video technology on 2025.      ASSESSMENT & PLAN:    Problem List Items Addressed This Visit          Other    Adult ADHD (attention deficit hyperactivity disorder) - Primary    Chronic, not controlled on 10 mg of Adderall XR 10 mg.  Notes decreased appetite but no other side effects.  Will try increasing dose to 20 mg and will recheck in 2 months.  Patient to let me know if she has any bad side effects.         Relevant Medications    amphetamine-dextroamphetamine (ADDERALL XR) 20 MG extended release capsule    amphetamine-dextroamphetamine (ADDERALL XR) 20 MG extended release capsule (Start on 2025)        The diagnoses and plan were discussed with the patient, who verbalizes understanding and agrees with plan.  All questions answered.    Chief Complaint    Chief Complaint   Patient presents with    Follow-up       HISTORY OF PRESENT ILLNESS    29 y.o. female with ADHD presents today for virtual appointment for follow up.  Last seen two months ago for difficulty focusing, diagnosed with ADHD per the Adult ADHD Self-Report done in the office.  Started on Adderall XR 10 mg daily to see if this would help.  Notes slightly decreased appetite but denies other side effects like palpitations, headaches or dizziness.  Does note that she has not had a significant improvement in her focus, just a slight improvement.      PAST MEDICAL HISTORY    Past Medical History:   Diagnosis Date    Anemia     Cold sore     Eczema     Genital herpes     Gonorrhea     Postpartum depression 2018       PAST SURGICAL HISTORY    Past Surgical History:   Procedure Laterality Date    BREAST REDUCTION SURGERY  2019     SECTION         FAMILY HISTORY    Family History   Problem Relation Age of Onset    Diabetes

## 2025-05-02 DIAGNOSIS — F90.9 ADULT ADHD (ATTENTION DEFICIT HYPERACTIVITY DISORDER): ICD-10-CM

## 2025-05-05 RX ORDER — DEXTROAMPHETAMINE SACCHARATE, AMPHETAMINE ASPARTATE MONOHYDRATE, DEXTROAMPHETAMINE SULFATE AND AMPHETAMINE SULFATE 5; 5; 5; 5 MG/1; MG/1; MG/1; MG/1
20 CAPSULE, EXTENDED RELEASE ORAL EVERY MORNING
Qty: 30 CAPSULE | Refills: 0 | Status: SHIPPED | OUTPATIENT
Start: 2025-05-05 | End: 2025-06-04

## 2025-06-09 ENCOUNTER — OFFICE VISIT (OUTPATIENT)
Dept: PRIMARY CARE CLINIC | Facility: CLINIC | Age: 30
End: 2025-06-09
Payer: COMMERCIAL

## 2025-06-09 VITALS
BODY MASS INDEX: 28.45 KG/M2 | HEART RATE: 60 BPM | WEIGHT: 203.2 LBS | TEMPERATURE: 97.2 F | HEIGHT: 71 IN | OXYGEN SATURATION: 98 % | DIASTOLIC BLOOD PRESSURE: 70 MMHG | SYSTOLIC BLOOD PRESSURE: 110 MMHG

## 2025-06-09 DIAGNOSIS — R29.818 SENSORY OVERLOAD: ICD-10-CM

## 2025-06-09 DIAGNOSIS — Z11.3 SCREENING FOR STD (SEXUALLY TRANSMITTED DISEASE): ICD-10-CM

## 2025-06-09 DIAGNOSIS — F90.9 ADULT ADHD (ATTENTION DEFICIT HYPERACTIVITY DISORDER): Primary | ICD-10-CM

## 2025-06-09 LAB
HIV 1+2 AB+HIV1 P24 AG SERPL QL IA: NONREACTIVE
HIV 1/2 RESULT COMMENT: NORMAL
T PALLIDUM AB SER QL IA: NONREACTIVE

## 2025-06-09 PROCEDURE — 99214 OFFICE O/P EST MOD 30 MIN: CPT

## 2025-06-09 RX ORDER — BUSPIRONE HYDROCHLORIDE 5 MG/1
5 TABLET ORAL 3 TIMES DAILY PRN
Qty: 90 TABLET | Refills: 0 | Status: SHIPPED | OUTPATIENT
Start: 2025-06-09 | End: 2025-07-09

## 2025-06-09 RX ORDER — DEXTROAMPHETAMINE SACCHARATE, AMPHETAMINE ASPARTATE MONOHYDRATE, DEXTROAMPHETAMINE SULFATE AND AMPHETAMINE SULFATE 5; 5; 5; 5 MG/1; MG/1; MG/1; MG/1
20 CAPSULE, EXTENDED RELEASE ORAL EVERY MORNING
Qty: 30 CAPSULE | Refills: 0 | Status: CANCELLED | OUTPATIENT
Start: 2025-06-09 | End: 2025-07-09

## 2025-06-09 RX ORDER — DEXTROAMPHETAMINE SACCHARATE, AMPHETAMINE ASPARTATE MONOHYDRATE, DEXTROAMPHETAMINE SULFATE AND AMPHETAMINE SULFATE 5; 5; 5; 5 MG/1; MG/1; MG/1; MG/1
20 CAPSULE, EXTENDED RELEASE ORAL DAILY
Qty: 30 CAPSULE | Refills: 0 | Status: SHIPPED | OUTPATIENT
Start: 2025-06-09 | End: 2025-07-09

## 2025-06-09 RX ORDER — DEXTROAMPHETAMINE SACCHARATE, AMPHETAMINE ASPARTATE MONOHYDRATE, DEXTROAMPHETAMINE SULFATE AND AMPHETAMINE SULFATE 5; 5; 5; 5 MG/1; MG/1; MG/1; MG/1
20 CAPSULE, EXTENDED RELEASE ORAL DAILY
Qty: 30 CAPSULE | Refills: 0 | Status: SHIPPED | OUTPATIENT
Start: 2025-07-09 | End: 2025-08-08

## 2025-06-09 RX ORDER — DEXTROAMPHETAMINE SACCHARATE, AMPHETAMINE ASPARTATE MONOHYDRATE, DEXTROAMPHETAMINE SULFATE AND AMPHETAMINE SULFATE 5; 5; 5; 5 MG/1; MG/1; MG/1; MG/1
20 CAPSULE, EXTENDED RELEASE ORAL DAILY
Qty: 30 CAPSULE | Refills: 0 | Status: SHIPPED | OUTPATIENT
Start: 2025-08-08 | End: 2025-09-07

## 2025-06-09 NOTE — PROGRESS NOTES
[facility]  Karen \"Shannon\" ZAHIDA Arredondo  [address], [place] [address]  [phone]         ASSESSMENT AND PLAN    Problem List Items Addressed This Visit       Adult ADHD (attention deficit hyperactivity disorder)    Relevant Medications    amphetamine-dextroamphetamine (ADDERALL XR) 20 MG extended release capsule    amphetamine-dextroamphetamine (ADDERALL XR) 20 MG extended release capsule (Start on 7/9/2025)    amphetamine-dextroamphetamine (ADDERALL XR) 20 MG extended release capsule (Start on 8/8/2025)    busPIRone (BUSPAR) 5 MG tablet     Other Visit Diagnoses         Screening for STD (sexually transmitted disease)    -  Primary    Relevant Orders    Chlamydia, Gonorrhea, Trichomoniasis (Completed)    HIV 1/2 Ag/Ab, 4TH Generation,W Rflx Confirm (Completed)    Hepatitis C Ab, Rflx to Qt by PCR (Completed)    HSV 1 and 2 Specific Ab, IgG (Completed)            Assessment & Plan  1. Attention deficit hyperactivity disorder (ADHD): Stable.  - Reports that the current dose of Adderall 20 mg is lasting throughout the day and helping with focus.  - No issues with Adderall, such as palpitations, were noted.  - A prescription for a 3-month supply of Adderall 20 mg capsules will be sent to the pharmacy.  - PDMP reviewed    2. Overstimulation. Chronic, worsening  - Discussed the potential benefits of Wellbutrin for ADHD, anxiety, and depression.  - BuSpar was chosen as an initial treatment due to its as-needed dosing flexibility.  - A prescription for BuSpar 5 mg tablets, with instructions to titrate up to 15 mg as needed, will be provided.  - Advised to adjust the dosage based on response and to communicate any issues or concerns before the next appointment.  - If BuSpar proves ineffective, Wellbutrin will be considered.    3. Screening for sexually transmitted infections (STIs).  - Concerns about a dishonest partner prompted the need for testing.  - A full panel of

## 2025-06-10 ENCOUNTER — RESULTS FOLLOW-UP (OUTPATIENT)
Dept: PRIMARY CARE CLINIC | Facility: CLINIC | Age: 30
End: 2025-06-10

## 2025-06-10 LAB
C TRACH RRNA SPEC QL NAA+PROBE: NEGATIVE
HCV AB SERPL QL IA: NORMAL
HCV IGG SERPL QL IA: NON REACTIVE S/CO RATIO
HSV1 IGG SER IA-ACNC: REACTIVE
HSV2 IGG SER IA-ACNC: REACTIVE
N GONORRHOEA RRNA SPEC QL NAA+PROBE: NEGATIVE
SPECIMEN SOURCE: NORMAL
T VAGINALIS RRNA SPEC QL NAA+PROBE: NEGATIVE

## 2025-06-14 PROBLEM — Z11.3 SCREENING FOR STD (SEXUALLY TRANSMITTED DISEASE): Status: ACTIVE | Noted: 2025-06-14

## 2025-06-14 PROBLEM — R29.818 SENSORY OVERLOAD: Status: ACTIVE | Noted: 2025-06-14

## 2025-07-14 PROBLEM — Z11.3 SCREENING FOR STD (SEXUALLY TRANSMITTED DISEASE): Status: RESOLVED | Noted: 2025-06-14 | Resolved: 2025-07-14

## 2025-08-14 ENCOUNTER — OFFICE VISIT (OUTPATIENT)
Dept: PRIMARY CARE CLINIC | Facility: CLINIC | Age: 30
End: 2025-08-14
Payer: COMMERCIAL

## 2025-08-14 VITALS
OXYGEN SATURATION: 98 % | WEIGHT: 211 LBS | HEIGHT: 71 IN | TEMPERATURE: 99.3 F | HEART RATE: 66 BPM | DIASTOLIC BLOOD PRESSURE: 62 MMHG | SYSTOLIC BLOOD PRESSURE: 120 MMHG | BODY MASS INDEX: 29.54 KG/M2

## 2025-08-14 DIAGNOSIS — F90.9 ADULT ADHD (ATTENTION DEFICIT HYPERACTIVITY DISORDER): Primary | ICD-10-CM

## 2025-08-14 DIAGNOSIS — B00.9 HSV INFECTION: ICD-10-CM

## 2025-08-14 DIAGNOSIS — R29.818 SENSORY OVERLOAD: ICD-10-CM

## 2025-08-14 PROCEDURE — 99214 OFFICE O/P EST MOD 30 MIN: CPT | Performed by: FAMILY MEDICINE

## 2025-08-14 RX ORDER — DEXTROAMPHETAMINE SACCHARATE, AMPHETAMINE ASPARTATE MONOHYDRATE, DEXTROAMPHETAMINE SULFATE AND AMPHETAMINE SULFATE 5; 5; 5; 5 MG/1; MG/1; MG/1; MG/1
20 CAPSULE, EXTENDED RELEASE ORAL EVERY MORNING
Qty: 30 CAPSULE | Refills: 0 | Status: CANCELLED | OUTPATIENT
Start: 2025-09-07 | End: 2025-10-07

## 2025-08-14 RX ORDER — VALACYCLOVIR HYDROCHLORIDE 500 MG/1
TABLET, FILM COATED ORAL
COMMUNITY
Start: 2025-07-31 | End: 2025-08-14 | Stop reason: SDUPTHER

## 2025-08-14 RX ORDER — DEXTROAMPHETAMINE SACCHARATE, AMPHETAMINE ASPARTATE MONOHYDRATE, DEXTROAMPHETAMINE SULFATE AND AMPHETAMINE SULFATE 5; 5; 5; 5 MG/1; MG/1; MG/1; MG/1
20 CAPSULE, EXTENDED RELEASE ORAL DAILY
Qty: 30 CAPSULE | Refills: 0 | Status: SHIPPED | OUTPATIENT
Start: 2025-08-14 | End: 2025-09-13

## 2025-08-14 RX ORDER — DEXTROAMPHETAMINE SACCHARATE, AMPHETAMINE ASPARTATE MONOHYDRATE, DEXTROAMPHETAMINE SULFATE AND AMPHETAMINE SULFATE 5; 5; 5; 5 MG/1; MG/1; MG/1; MG/1
20 CAPSULE, EXTENDED RELEASE ORAL DAILY
Qty: 30 CAPSULE | Refills: 0 | Status: SHIPPED | OUTPATIENT
Start: 2025-09-13 | End: 2025-10-13

## 2025-08-14 RX ORDER — DEXTROAMPHETAMINE SACCHARATE, AMPHETAMINE ASPARTATE MONOHYDRATE, DEXTROAMPHETAMINE SULFATE AND AMPHETAMINE SULFATE 5; 5; 5; 5 MG/1; MG/1; MG/1; MG/1
20 CAPSULE, EXTENDED RELEASE ORAL DAILY
Qty: 30 CAPSULE | Refills: 0 | Status: SHIPPED | OUTPATIENT
Start: 2025-10-13 | End: 2025-11-12

## 2025-08-14 RX ORDER — DEXTROAMPHETAMINE SACCHARATE, AMPHETAMINE ASPARTATE MONOHYDRATE, DEXTROAMPHETAMINE SULFATE AND AMPHETAMINE SULFATE 5; 5; 5; 5 MG/1; MG/1; MG/1; MG/1
20 CAPSULE, EXTENDED RELEASE ORAL EVERY MORNING
Qty: 30 CAPSULE | Refills: 0 | Status: CANCELLED | OUTPATIENT
Start: 2025-10-07 | End: 2025-11-06

## 2025-08-14 RX ORDER — VALACYCLOVIR HYDROCHLORIDE 500 MG/1
500 TABLET, FILM COATED ORAL DAILY
Qty: 90 TABLET | Refills: 1 | Status: SHIPPED | OUTPATIENT
Start: 2025-08-14

## 2025-08-14 ASSESSMENT — ENCOUNTER SYMPTOMS
COUGH: 0
NAUSEA: 0
DIARRHEA: 0
SHORTNESS OF BREATH: 0
ABDOMINAL PAIN: 0
VOMITING: 0

## 2025-08-14 ASSESSMENT — PATIENT HEALTH QUESTIONNAIRE - PHQ9
SUM OF ALL RESPONSES TO PHQ QUESTIONS 1-9: 0
1. LITTLE INTEREST OR PLEASURE IN DOING THINGS: NOT AT ALL